# Patient Record
Sex: MALE | Race: OTHER | HISPANIC OR LATINO | URBAN - METROPOLITAN AREA
[De-identification: names, ages, dates, MRNs, and addresses within clinical notes are randomized per-mention and may not be internally consistent; named-entity substitution may affect disease eponyms.]

---

## 2018-06-05 ENCOUNTER — EMERGENCY (EMERGENCY)
Facility: HOSPITAL | Age: 23
LOS: 1 days | Discharge: HOME | End: 2018-06-05
Admitting: EMERGENCY MEDICINE
Payer: SELF-PAY

## 2018-06-05 VITALS
SYSTOLIC BLOOD PRESSURE: 130 MMHG | DIASTOLIC BLOOD PRESSURE: 60 MMHG | OXYGEN SATURATION: 100 % | RESPIRATION RATE: 18 BRPM | TEMPERATURE: 98 F | HEART RATE: 70 BPM | WEIGHT: 169.98 LBS
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
## 2018-06-05 DIAGNOSIS — S61.211A LACERATION WITHOUT FOREIGN BODY OF LEFT INDEX FINGER WITHOUT DAMAGE TO NAIL, INITIAL ENCOUNTER: ICD-10-CM
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
## 2018-06-05 PROCEDURE — 12002 RPR S/N/AX/GEN/TRNK2.6-7.5CM: CPT

## 2018-06-05 PROCEDURE — 99053 MED SERV 10PM-8AM 24 HR FAC: CPT

## 2018-06-05 PROCEDURE — 99283 EMERGENCY DEPT VISIT LOW MDM: CPT | Mod: 25

## 2018-06-05 PROCEDURE — 99282 EMERGENCY DEPT VISIT SF MDM: CPT | Mod: 25

## 2018-06-05 NOTE — ED PROVIDER NOTE - MUSCULOSKELETAL, MLM
L 2nd digit w/ a 3 cm jagged lac to distal aspect over palmar aspect, + bleeding, no fb, no tendon lac, + light touch, no DIP involvement, FROM, cap refill < 2 sec

## 2018-06-05 NOTE — ED ADULT TRIAGE NOTE - CHIEF COMPLAINT QUOTE
laceration to lt index finger with sharp edge object  sustained while at work this morning. dressing applied ,No active bleeding

## 2018-06-05 NOTE — ED PROVIDER NOTE - OBJECTIVE STATEMENT
The pt is a 23 y/o M, who presents to ED c/o cut to L index finger - cut on something at work [employer]. + bleeding, + pain, last Td 2 yrs ago. Denies numbness or tingling to tip, decreased ROM, FB sensation

## 2018-06-12 ENCOUNTER — EMERGENCY (EMERGENCY)
Facility: HOSPITAL | Age: 23
LOS: 1 days | Discharge: HOME | End: 2018-06-12
Admitting: EMERGENCY MEDICINE
Payer: SELF-PAY

## 2018-06-12 VITALS
TEMPERATURE: 99 F | DIASTOLIC BLOOD PRESSURE: 66 MMHG | HEART RATE: 71 BPM | RESPIRATION RATE: 16 BRPM | OXYGEN SATURATION: 97 % | SYSTOLIC BLOOD PRESSURE: 104 MMHG | WEIGHT: 160.06 LBS

## 2018-06-12 DIAGNOSIS — S61.211D LACERATION WITHOUT FOREIGN BODY OF LEFT INDEX FINGER WITHOUT DAMAGE TO NAIL, SUBSEQUENT ENCOUNTER: ICD-10-CM
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
## 2018-06-12 PROCEDURE — 99212 OFFICE O/P EST SF 10 MIN: CPT

## 2018-06-12 RX ORDER — AZTREONAM 2 G
1 VIAL (EA) INJECTION
Qty: 20 | Refills: 0 | OUTPATIENT
Start: 2018-06-12 | End: 2018-06-21

## 2018-06-12 NOTE — ED PROVIDER NOTE - MEDICAL DECISION MAKING DETAILS
suture removal. sutures removed without complication. neurovascular intact. ? early infection vs irritation for sutures. will tx with bactrim and wound check in 2 days. return precautions d/w pt.

## 2018-06-12 NOTE — ED PROVIDER NOTE - PHYSICAL EXAMINATION
sutures intact to distal left index finger. FROM. distal NVI. mild swelling at suture line and mild tenderness. no d/c. no erythema.

## 2018-06-12 NOTE — ED PROVIDER NOTE - OBJECTIVE STATEMENT
23 y/o male with no sig PMHx here for suture removal. sutures placed 7 days ago. no numbness, tingling or weakness. no fever or chills. no d/c. no further complaints.

## 2018-06-12 NOTE — ED ADULT NURSE NOTE - OBJECTIVE STATEMENT
suture removal to left 2nd finger that was placed ~ 7 days ago. c/o numbness to site. Denies fever or chills. Would is clean and dry.

## 2018-06-14 ENCOUNTER — EMERGENCY (EMERGENCY)
Facility: HOSPITAL | Age: 23
LOS: 1 days | Discharge: HOME | End: 2018-06-14
Admitting: EMERGENCY MEDICINE
Payer: SELF-PAY

## 2018-06-14 VITALS
DIASTOLIC BLOOD PRESSURE: 72 MMHG | SYSTOLIC BLOOD PRESSURE: 120 MMHG | WEIGHT: 148.15 LBS | TEMPERATURE: 98 F | HEART RATE: 68 BPM | OXYGEN SATURATION: 98 % | RESPIRATION RATE: 18 BRPM

## 2018-06-14 DIAGNOSIS — Z79.2 LONG TERM (CURRENT) USE OF ANTIBIOTICS: ICD-10-CM

## 2018-06-14 DIAGNOSIS — S61.211D LACERATION WITHOUT FOREIGN BODY OF LEFT INDEX FINGER WITHOUT DAMAGE TO NAIL, SUBSEQUENT ENCOUNTER: ICD-10-CM
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
## 2018-06-14 PROCEDURE — 99283 EMERGENCY DEPT VISIT LOW MDM: CPT

## 2018-06-14 PROCEDURE — 99282 EMERGENCY DEPT VISIT SF MDM: CPT

## 2018-06-14 NOTE — ED PROVIDER NOTE - OBJECTIVE STATEMENT
23 y/o male with left index finger wound check. Pt reports minimal swelling and notes improvement. Reports mild numbness, however improving to the distal fingertip and denies fever, swelling or discharge.

## 2018-06-14 NOTE — ED ADULT NURSE NOTE - OBJECTIVE STATEMENT
22y M, A&ox3, presents to ed for wound check to left 2nd digit. pt reports had laceration and suture removal earlier this week. states "just need to make sure it is healing ok but me personally it feels alright" mild redness, no drainage. no fever nor chills. Wll continue to monitor

## 2018-06-14 NOTE — ED PROVIDER NOTE - MEDICAL DECISION MAKING DETAILS
left index finger wound check after suture removal 2 days ago. Well-healing with mild erythema, no discharge, no ttp, mild swelling. Does not look infected, erythema due to irritation. Advised continue wound care and fu with pmd.

## 2018-12-04 NOTE — ED ADULT NURSE NOTE - ED COMFORT CARE
----- Message from Edel Munguia sent at 12/4/2018 10:32 AM EST -----  Regarding: REFILL  Contact: [phone]  VIT D 90 day supply    [facility] [place]  
Sent in the script for the patient per the provider previous instructions.   
Patient informed
